# Patient Record
Sex: FEMALE | Race: WHITE | URBAN - METROPOLITAN AREA
[De-identification: names, ages, dates, MRNs, and addresses within clinical notes are randomized per-mention and may not be internally consistent; named-entity substitution may affect disease eponyms.]

---

## 2024-11-04 PROCEDURE — 99283 EMERGENCY DEPT VISIT LOW MDM: CPT

## 2024-11-04 ASSESSMENT — LIFESTYLE VARIABLES: HOW OFTEN DO YOU HAVE A DRINK CONTAINING ALCOHOL: NEVER

## 2024-11-05 ENCOUNTER — HOSPITAL ENCOUNTER (EMERGENCY)
Age: 24
Discharge: HOME | End: 2024-11-05
Payer: COMMERCIAL

## 2024-11-05 VITALS
TEMPERATURE: 99.5 F | DIASTOLIC BLOOD PRESSURE: 58 MMHG | HEART RATE: 94 BPM | RESPIRATION RATE: 18 BRPM | SYSTOLIC BLOOD PRESSURE: 123 MMHG | OXYGEN SATURATION: 99 % | WEIGHT: 293 LBS

## 2024-11-05 DIAGNOSIS — M54.32 SCIATICA, LEFT SIDE: Primary | ICD-10-CM

## 2024-11-05 PROCEDURE — 6370000000 HC RX 637 (ALT 250 FOR IP): Performed by: NURSE PRACTITIONER

## 2024-11-05 RX ORDER — IBUPROFEN 400 MG/1
400 TABLET, FILM COATED ORAL ONCE
Status: COMPLETED | OUTPATIENT
Start: 2024-11-05 | End: 2024-11-05

## 2024-11-05 RX ORDER — CYCLOBENZAPRINE HCL 10 MG
10 TABLET ORAL 3 TIMES DAILY PRN
Qty: 21 TABLET | Refills: 0 | Status: SHIPPED | OUTPATIENT
Start: 2024-11-05 | End: 2024-11-15

## 2024-11-05 RX ORDER — CYCLOBENZAPRINE HCL 5 MG
10 TABLET ORAL ONCE
Status: COMPLETED | OUTPATIENT
Start: 2024-11-05 | End: 2024-11-05

## 2024-11-05 RX ORDER — IBUPROFEN 400 MG/1
400 TABLET, FILM COATED ORAL EVERY 6 HOURS PRN
Qty: 120 TABLET | Refills: 0 | Status: SHIPPED | OUTPATIENT
Start: 2024-11-05

## 2024-11-05 RX ADMIN — IBUPROFEN 400 MG: 400 TABLET, FILM COATED ORAL at 01:21

## 2024-11-05 RX ADMIN — CYCLOBENZAPRINE HYDROCHLORIDE 10 MG: 5 TABLET, FILM COATED ORAL at 01:21

## 2024-11-05 ASSESSMENT — PAIN DESCRIPTION - DESCRIPTORS: DESCRIPTORS: ACHING;CRAMPING;DISCOMFORT

## 2024-11-05 ASSESSMENT — PAIN SCALES - GENERAL: PAINLEVEL_OUTOF10: 7

## 2024-11-05 ASSESSMENT — PAIN - FUNCTIONAL ASSESSMENT
PAIN_FUNCTIONAL_ASSESSMENT: 0-10
PAIN_FUNCTIONAL_ASSESSMENT: ACTIVITIES ARE NOT PREVENTED

## 2024-11-05 ASSESSMENT — PAIN DESCRIPTION - ORIENTATION: ORIENTATION: LOWER;LEFT

## 2024-11-05 ASSESSMENT — PAIN DESCRIPTION - LOCATION: LOCATION: KNEE;LEG

## 2024-11-05 NOTE — ED NOTES
Department of Emergency Medicine  FIRST PROVIDER TRIAGE NOTE             Independent MLP           11/4/24  11:02 PM EST    Date of Encounter: 11/4/24   MRN: [id number]      HPI: Nisha Peguero is a 24 y.o. female who presents to the ED for Leg Pain (Left upper leg pain into lower leg for 3 days, no injury)   Pain radiates down left lateral thigh to left foot.    ROS: Negative for fever.    PE: Gen Appearance/Constitutional: alert  HEENT: NC/NT. PERRLA,  Airway patent.  Neck: supple  CV: regular rate  Pulm: CTA bilat  GI: soft and NT  Musculoskeletal: moves all extremities x 4  Lymphatics: no edema     Initial Plan of Care: All treatment areas with department are currently occupied.  Proceed toTreatment Area When Bed Available for ED Attending/MLP to Continue Care    Electronically signed by LOU Ramos CNP   DD: 11/4/24    ATTENDING PROVIDER ATTESTATION:     Supervising Physician, on-site, available for consultation, non-participatory in the evaluation or care of this patient.         Vamsi Martin DO  11/05/24 0723

## 2024-11-05 NOTE — ED PROVIDER NOTES
development consistent with age.  HEENT:  NC/NT.  Airway patent.  Neck:  Normal ROM.  Supple.  Physical Exam  Left Lower Extremity(s): Diffusely along the entire left leg             Tenderness:  none.               Swelling: None.        Calf:  No evidence of DVT seen on physical exam..             Deformity: no deformity observed/palpated.               ROM: full range of motion.               Skin:  no wounds, erythema, or swelling.   Neurovascular:               Motor deficit: none.               Sensory deficit: none.                Pulse deficit: none.               Capillary refill: normal.  Gait:  normal.  Lymphatics: No lymphangitis or adenopathy noted.  Neurological:  Oriented x3.  Motor functions intact.    Lab / Imaging Results   (All laboratory and radiology results have been personally reviewed by myself)  Labs:  No results found for this visit on 11/05/24.  Imaging:  All Radiology results interpreted by Radiologist unless otherwise noted.  No orders to display     ED Course / Medical Decision Making     Medications   cyclobenzaprine (FLEXERIL) tablet 10 mg (10 mg Oral Given 11/5/24 0121)   ibuprofen (ADVIL;MOTRIN) tablet 400 mg (400 mg Oral Given 11/5/24 0121)        Consults:   None    Procedure(s):  None    MDM:     Briefly is a 24-year-old female patient is presenting with a 3-day history of pain to the lateral aspect of her left leg that travels, also the leg to the outside of the knee down to the foot.  Is not having any back pain.  No red flag symptoms.  On exam patient's pain is reproducible with direct palpation over the left SI joint.  Negative straight leg testing bilaterally, no midline lumbar spinal tenderness.  There is no obvious deformity of the left lower extremity.  Discussed with patient and patient's mother who is present with her history of physical exam consistent with sciatic pain.  Advised on stretching exercises, advised on use of muscle relaxing agents.  I did offer steroids

## 2024-11-05 NOTE — DISCHARGE INSTRUCTIONS
Please perform the attached stretching exercises.  You may use Flexeril 3 times daily as needed for muscle spasms, ibuprofen every 6 hours as needed for pain.  Please follow-up with physical medicine rehabilitation symptoms do not improve.

## 2024-11-06 ENCOUNTER — TELEPHONE (OUTPATIENT)
Dept: PHYSICAL MEDICINE AND REHAB | Age: 24
End: 2024-11-06

## 2024-11-06 NOTE — TELEPHONE ENCOUNTER
----- Message from Dr. Ludivina Zavala DO sent at 11/5/2024  1:51 PM EST -----    ----- Message -----  From: Discharge Provider, Automatic  Sent: 11/5/2024   1:29 PM EST  To: Ludivina Zavala DO